# Patient Record
Sex: FEMALE | Race: OTHER | URBAN - METROPOLITAN AREA
[De-identification: names, ages, dates, MRNs, and addresses within clinical notes are randomized per-mention and may not be internally consistent; named-entity substitution may affect disease eponyms.]

---

## 2023-01-26 ENCOUNTER — HOSPITAL ENCOUNTER (OUTPATIENT)
Dept: PHYSICAL THERAPY | Age: 53
Discharge: HOME | End: 2023-01-26
Payer: OTHER GOVERNMENT

## 2023-01-26 PROCEDURE — 95992 CANALITH REPOSITIONING PROC: CPT

## 2023-01-26 PROCEDURE — 97161 PT EVAL LOW COMPLEX 20 MIN: CPT

## 2023-01-26 NOTE — PROGRESS NOTES
PHYSICAL THERAPY - DAILY TREATMENT NOTE     Patient Name: Hortensia Phillips        Date: 2023  : 1970   YES Patient  Verified  Visit #:   1     Insurance: Payor:  / Plan: Min Poe [id number] / Product Type:  /      In time: 335 Out time: 425   Total Treatment Time: 50     Medicare/[employer] Time Tracking (below)   Total Timed Codes (min):   1:1 Treatment Time:       TREATMENT AREA =  Dizziness and giddiness [R42]    SUBJECTIVE    Pain Level (on 0 to 10 scale):  0  / 10   Medication Changes/New allergies or changes in medical history, any new surgeries or procedures?     NO    If yes, update Summary List   Subjective Functional Status/Changes:  []  No changes reported     See eval /POC         OBJECTIVE  Modalities Rationale:     decrease pain to improve patient's ability to return to PLOF      min [] Estim, type/location:                                      []  att     []  unatt     []  w/US     []  w/ice    []  w/heat    min []  Mechanical Traction: type/lbs                   []  pro   []  sup   []  int   []  cont    []  before manual    []  after manual    min []  Ultrasound, settings/location:      min []  Iontophoresis w/ dexamethasone, location:                                               []  take home patch       []  in clinic    min []  Ice     []  Heat    location/position:     min []  Vasopneumatic Device, press/temp:     min []  Other:    [] Skin assessment post-treatment (if applicable):    []  intact    []  redness- no adverse reaction     []redness - adverse reaction:       min Therapeutic Exercise:  [x]  See flow sheet   Rationale:      increase ROM and increase strength to improve the patients ability to return to PLOF     10 min Canalith Repositioning Manuever: Technique:      [] S/DTM []IASTM []PROM [] Passive Stretching   []manual TPR    []Jt manipulation:Gr I [] II []  III [] IV[]  [x]L Roll  Treatment Area:  vestibular   Rationale:      decrease pain, increase ROM, increase tissue extensibility and decrease trigger points to improve patient's ability to return to PLOF     min Neuromuscular Re-ed: [x]  See flow sheet   Rationale:      improve coordination, improve balance, increase proprioception and dec dizziness to improve the patients ability to return to PLOF        min Self Care:    Rationale:    increase ROM, increase strength and improve coordination to improve the patients ability to return to PLOF    Billed With/As:   [] TE   [] TA   [] Neuro   [] Self Care Patient Education: [x] Review HEP    [] Progressed/Changed HEP based on:   [] positioning   [] body mechanics   [] transfers   [] heat/ice application    [] other:        Other Objective/Functional Measures:    See eval/ POC     Post Treatment Pain Level (on 0 to 10) scale:   0  / 10     ASSESSMENT    X  See POC     PLAN    [x]  Upgrade activities as tolerated {YES) Continue plan of care   []  Discharge due to :    []  Other:      Therapist: Favio Rhoades PT    Date: 1/26/2023 Time: 8:50 AM     Future Appointments   Date Time Provider Jacobo Beck   1/26/2023  3:30 PM Ronny Sky, PT [facility]

## 2023-01-26 NOTE — PROGRESS NOTES
[address] [place] [address] PHYSICAL THERAPY AT [facility] [address], [address] - Phone: [phone]  Fax: [phone] / [address] [facility]  Patient Name: Bharathi Modi : 1970   Medical   Diagnosis: Dizziness and giddiness [R42] Treatment Diagnosis: Dizziness;  imbalance   Onset Date: ; Worse since 2022     Referral Source: Gerhardt Doyne, MD [facility]): 2023   Prior Hospitalization: See medical history Provider #: [id number]   Prior Level of Function: Intermittent dizziness and imbalance with ADLs   Comorbidities: GI disease   Medications: Verified on Patient Summary List     The Plan of Care and following information is based on the information from the initial evaluation.     ========================================================================  Assessment / key information:   Bharathi Modi is a 50 y.o.  yo female with Dx of Dizziness and giddiness [R42]. She reports initial symptoms of dizziness and imbalance began in . She has had prior courses of treatment to include vestibular therapy. Symptoms have been manageable over the years. In 2022, she had an exacerbation of symptoms without known cause. 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 Current symptoms include: unsteadiness, especially on uneven surfaces or with head movements. She also has moments where she feels \"off. \". Objectively, the patient demonstrates  the following:  Balance: Decreased static balance: SLS: R= 15/15 sec, L= 15/15 sec, diminished ambulatory balance assessed via Functional Gait Assessment (FGA = 28/30 points)   Dizziness with ADLs: Dizziness Handicap Inventory (DHI = 16 points). Oculo-motor tests are WNLs except for reported increase in symptoms with Smooth Pursuit.     BPPV Assessment: Hallpike-[place] (right):neg, Hallpike-Veronica (left)neg, Roll Test (right):neg, Roll Test (left): neg for nystagmus but reported dizziness  Pt will benefit from PT/Vestibular rehab to address these deficits, improve functional independence and reduce dizziness and imbalance with normal daily activities. Thank you for this referral.   =======================================================================  Eval Complexity: History MEDIUM  Complexity : 1-2 comorbidities / personal factors will impact the outcome/ POC ;  Examination  HIGH Complexity : 4+ Standardized tests and measures addressing body structure, function, activity limitation and / or participation in recreation ; Presentation LOW Complexity : Stable, uncomplicated ;  Decision Making Other outcome measures DHI  LOW ; Overall Complexity LOW   Problem List: impaired gait/ balance, decrease ADL/ functional abilitiies, decrease activity tolerance and decrease transfer abilities   Treatment Plan may include any combination of the following: Therapeutic exercise, Therapeutic activities, Neuromuscular re-education, Gait/balance training and Patient education  Patient / Family readiness to learn indicated by: asking questions, trying to perform skills and interest  Persons(s) to be included in education: patient (P)  Barriers to Learning/Limitations: None  Measures taken, if barriers to learning:    Patient Goal (s): Better quality of life   Self reported health status: good  Rehabilitation Potential: excellent  Short Term Goals: To be accomplished in 4-6  treatments:  1. Patient will report at least 25% reduction of symptoms with ADLs. 2. Patient will be independent and complaint with HEP TID to reduce imbalance and dizziness with ADLs. 3. DHI will improve to less than or equal to 10 points to demonstrate reduction of dizziness and imbalance with ADLs. Long Term Goals: To be accomplished in 10-12 treatments:  1. Patient will report at least 50% reduction of symptoms with ADLs.   2. Patient will be independent with self progression of HEP and demonstrate willingness to continue HEP after D/C to maximize/maintain gains in functional mobility. 3. DHI will improve to less than or equal to 6 points to demonstrate significant reduction of dizziness and imbalance with ADLs. 4. FGA will improve to greater then or equal to 30/30 points to demonstrate a significant improvement in ambulatory balance. Frequency / Duration:   Patient to be seen  2  times per week for 4-6  weeks:  Patient / Caregiver education and instruction: self care, activity modification, and exercises    Therapist Signature: Thea Mcclellan PT Date: 3/61/2386   Certification Period:  Time: 4:28 PM   =====================================================================  I certify that the above Physical Therapy Services are being furnished while the patient is under my care. I agree with the treatment plan and certify that this therapy is necessary. Physician Signature:        Date:       Time:       Erin Petit MD  Please sign and return to [facility] or you may fax the signed copy to [phone]. Thank you.

## 2023-01-30 ENCOUNTER — HOSPITAL ENCOUNTER (OUTPATIENT)
Dept: PHYSICAL THERAPY | Age: 53
Discharge: HOME | End: 2023-01-30
Payer: OTHER GOVERNMENT

## 2023-01-30 PROCEDURE — 97530 THERAPEUTIC ACTIVITIES: CPT

## 2023-01-30 PROCEDURE — 97112 NEUROMUSCULAR REEDUCATION: CPT

## 2023-01-30 NOTE — PROGRESS NOTES
PHYSICAL THERAPY - DAILY TREATMENT NOTE     Patient Name: Lin Saeed        Date: 2023  : 1970   YES Patient  Verified  Visit #:   2     Insurance: Payor: BALJIT / Plan: Min Poe 74 / Product Type:  /      In time: 330 Out time: 405   Total Treatment Time: 35     Medicare/[facility] Time Tracking (below)   Total Timed Codes (min):   1:1 Treatment Time:       TREATMENT AREA =  Dizziness and giddiness [R42]    SUBJECTIVE    Pain Level (on 0 to 10 scale):  0  / 10   Medication Changes/New allergies or changes in medical history, any new surgeries or procedures? NO    If yes, update Summary List   Subjective Functional Status/Changes:  []  No changes reported     No dizziness today, slight dizziness day after first visit         OBJECTIVE      25 min Neuromuscular Re-ed: [x]  See flow sheet/ see below testing   Rationale:      improve coordination and improve balance to improve the patients ability to perform ADLs with dec Sx     10 min Therapeutic Activity: [x]  See flow sheet   Rationale:      increase strength, improve coordination, and improve balance to improve the patients ability to perform ADLs safely         Billed With/As:   [] TE   [] TA   [x] Neuro   [] Self Care Patient Education: [x] Review HEP    [] Progressed/Changed HEP based on:   [] positioning   [] body mechanics   [] transfers   [] heat/ice application    [] other:        Other Objective/Functional Measures:    B Roll: neg  B DH: neg   Post Treatment Pain Level (on 0 to 10) scale:   0  / 10     ASSESSMENT    Assessment/Changes in Function:     Initiated HEP--see flow sheet     []  See Progress Note/Recertification   Patient will continue to benefit from skilled PT services to modify and progress therapeutic interventions, address functional mobility deficits, analyze and cue movement patterns, and address imbalance/dizziness to attain remaining goals.       Progress toward goals / Updated goals:    Good Progress to    [] STG    [] LTG  2 as shown by initiation of HEP       []  See Progress Note/Recertification    PLAN    [x]  Upgrade activities as tolerated {YES) Continue plan of care   []  Discharge due to :    []  Other:      Therapist: Licha Cowan PT    Date: 1/30/2023 Time: 7:53 AM     Future Appointments   Date Time Provider Jacobo Beck   1/30/2023  3:30 PM Waterbury Hammock, PT [facility]   2/2/2023  5:00 PM Sheyla Moat, PTA [facility]   2/6/2023  4:30 PM Waterbury Hammock, PT [facility]   2/9/2023  5:00 PM Brian Hammock, PT [facility]   2/13/2023  3:00 PM Sheyla Moat, PTA [facility]   2/16/2023  4:30 PM Sheyla Moat, PTA [facility]   2/20/2023  4:30 PM Brian Hammock, PT [facility]   2/23/2023  4:30 PM Waterbury Hammock, PT [facility]   2/27/2023  4:30 PM Brian Hammock, PT [facility]   3/2/2023  4:30 PM Waterbury Hammock, PT [facility]

## 2023-02-02 ENCOUNTER — HOSPITAL ENCOUNTER (OUTPATIENT)
Dept: PHYSICAL THERAPY | Age: 53
Discharge: HOME | End: 2023-02-02
Payer: OTHER GOVERNMENT

## 2023-02-02 PROCEDURE — 97112 NEUROMUSCULAR REEDUCATION: CPT

## 2023-02-02 NOTE — PROGRESS NOTES
PHYSICAL THERAPY - DAILY TREATMENT NOTE     Patient Name: Mingo Pace        Date: 2023  : 1970   YES Patient  Verified  Visit #:   3   of     Insurance: Payor: BALJIT / Plan: Min Poe 74 / Product Type:  /      In time: 5:07 pm Out time: 5:22    Total Treatment Time: 15     Medicare/[facility] Time Tracking (below)   Total Timed Codes (min):  - 1:1 Treatment Time:  -     TREATMENT AREA =  Dizziness and giddiness [R42]    SUBJECTIVE    Pain Level (on 0 to 10 scale):  0  / 10   Medication Changes/New allergies or changes in medical history, any new surgeries or procedures? NO    If yes, update Summary List   Subjective Functional Status/Changes:  []  No changes reported     Pt reports mild sx today. Performing HEP 3x per day as instructed         OBJECTIVE  Modalities Rationale:    n/a       15 min Neuromuscular Re-ed: [x]  See flow sheet   Rationale:      increase ROM, improve coordination, improve balance, and increase proprioception to improve the patients ability to decrease dizziness sx in ADL's         Billed With/As:   [] TE   [] TA   [] Neuro   [] Self Care Patient Education: [x] Review HEP    [] Progressed/Changed HEP based on:   [] positioning   [] body mechanics   [] transfers   [] heat/ice application    [] other:        Other Objective/Functional Measures:    Review initial HEP     Post Treatment Pain Level (on 0 to 10) scale:   0  / 10     ASSESSMENT    Assessment/Changes in Function:     Advanced static and dynamic balance per flow sheet  Mild sx during Gt with HT, Smooth pursuit/Saccades for bilateral digaonal patterns. []  See Progress Note/Recertification   Patient will continue to benefit from skilled PT services to modify and progress therapeutic interventions, address functional mobility deficits, address ROM deficits, address imbalance/dizziness, and instruct in home and community integration to attain remaining goals.       Progress toward goals / Updated goals:    First visit from initial evaluation.  Progressed treatment per plan of care       PLAN    [x]  Upgrade activities as tolerated YES Continue plan of care   []  Discharge due to :    []  Other:      Therapist: Kj Whalen PTA    Date: 2/2/2023 Time: 5:22 PM     Future Appointments   Date Time Provider Jacobo Beck   2/6/2023  4:30 PM Mariana Cargo, PT [facility]   2/9/2023  5:00 PM Mariana Cargo, PT [facility]   2/13/2023  3:00 PM Ramonia Human, PTA [facility]   2/16/2023  4:30 PM Ramonia Human, PTA [facility]   2/20/2023  4:30 PM Mariana Cargo, PT [facility]   2/23/2023  4:30 PM Mariana Cargo, PT [facility]   2/27/2023  4:30 PM Mariana Cargo, PT [facility]   3/2/2023  4:30 PM Mariana Cargo, PT [facility]

## 2023-02-06 ENCOUNTER — HOSPITAL ENCOUNTER (OUTPATIENT)
Dept: PHYSICAL THERAPY | Age: 53
Discharge: HOME | End: 2023-02-06
Payer: OTHER GOVERNMENT

## 2023-02-06 PROCEDURE — 97530 THERAPEUTIC ACTIVITIES: CPT

## 2023-02-06 PROCEDURE — 97112 NEUROMUSCULAR REEDUCATION: CPT

## 2023-02-06 NOTE — PROGRESS NOTES
PHYSICAL THERAPY - DAILY TREATMENT NOTE     Patient Name: Sean Mcfarland        Date: 2023  : 1970   YES Patient  Verified  Visit #:   4     Insurance: Payor: BALJIT / Plan: Min Poe 74 / Product Type:  /      In time: 425 Out time: 505   Total Treatment Time: 40     Medicare/[facility] Time Tracking (below)   Total Timed Codes (min):   1:1 Treatment Time:       TREATMENT AREA =  Dizziness and giddiness [R42]    SUBJECTIVE    Pain Level (on 0 to 10 scale):  0  / 10   Medication Changes/New allergies or changes in medical history, any new surgeries or procedures? NO    If yes, update Summary List   Subjective Functional Status/Changes:  []  No changes reported     No dizziness currently but had momentary unsteadiness in the shower.     Doing ex's at home         OBJECTIVE    25 min Neuromuscular Re-ed: [x]  See flow sheet   Rationale:      increase ROM, improve coordination, improve balance, and increase proprioception to improve the patients ability to decrease dizziness sx in ADL's      15 min Therapeutic Activity: [x]  See flow sheet   Rationale:      increase strength, improve coordination, and improve balance to improve the patients ability to perform ADLs safely       Billed With/As:   [] TE   [] TA   [] Neuro   [] Self Care Patient Education: [x] Review HEP    [] Progressed/Changed HEP based on:   [] positioning   [] body mechanics   [] transfers   [] heat/ice application    [] other:            Other Objective/Functional Measures:    Progressed VSE to standing with metronome     Added BOSU stance and LOS   Post Treatment Pain Level (on 0 to 10) scale:   0  / 10     ASSESSMENT    Assessment/Changes in Function:     Progressing balance and vestibular ex's     []  See Progress Note/Recertification    Patient will continue to benefit from skilled PT services to modify and progress therapeutic interventions, address functional mobility deficits, analyze and cue movement patterns, and address imbalance/dizziness to attain remaining goals. Progress toward goals / Updated goals:     Short Term Goals:   1. Patient will report at least 25% reduction of symptoms with ADLs. 2. Patient will be independent and complaint with HEP TID to reduce imbalance and dizziness with ADLs. --progressing  3. DHI will improve to less than or equal to 10 points to demonstrate reduction of dizziness and imbalance with ADLs. Long Term Goals:   1. Patient will report at least 50% reduction of symptoms with ADLs. 2. Patient will be independent with self progression of HEP and demonstrate willingness to continue HEP after D/C to maximize/maintain gains in functional mobility. 3. DHI will improve to less than or equal to 6 points to demonstrate significant reduction of dizziness and imbalance with ADLs. 4. FGA will improve to greater then or equal to 30/30 points to demonstrate a significant improvement in ambulatory balance.              PLAN    [x]  Upgrade activities as tolerated {YES) Continue plan of care   []  Discharge due to :    []  Other:      Therapist: Мария Caraballo PT    Date: 2/6/2023 Time: 7:52 AM     Future Appointments   Date Time Provider Jacobo Beck   2/6/2023  4:30 PM Wang Ortiz PT [facility]   2/9/2023  5:00 PM Wang Ortiz PT [facility]   2/13/2023  3:00 PM Madhavi [facility], PTA [facility]   2/16/2023  4:30 PM Madhavi Greenberg PTA [facility]   2/20/2023  4:30 PM Wang Ortiz, PT [facility]   2/23/2023  4:30 PM Wang Ortiz PT [facility]   2/27/2023  4:30 PM Wang Ortiz PT [facility]   3/2/2023  4:30 PM Wang Ortiz PT [facility]

## 2023-02-09 ENCOUNTER — HOSPITAL ENCOUNTER (OUTPATIENT)
Dept: PHYSICAL THERAPY | Age: 53
Discharge: HOME | End: 2023-02-09
Payer: OTHER GOVERNMENT

## 2023-02-09 PROCEDURE — 97112 NEUROMUSCULAR REEDUCATION: CPT

## 2023-02-09 PROCEDURE — 97530 THERAPEUTIC ACTIVITIES: CPT

## 2023-02-09 NOTE — PROGRESS NOTES
PHYSICAL THERAPY - DAILY TREATMENT NOTE     Patient Name: Tayo Dsouza        Date: 2023  : 1970   YES Patient  Verified  Visit #:   5     Insurance: Payor: BALJIT / Plan: Min Poe 74 / Product Type:  /      In time: 500 Out time: 540   Total Treatment Time: 40     Medicare/[employer] Time Tracking (below)   Total Timed Codes (min):   1:1 Treatment Time:       TREATMENT AREA =  Dizziness and giddiness [R42]    SUBJECTIVE    Pain Level (on 0 to 10 scale):  0  / 10   Medication Changes/New allergies or changes in medical history, any new surgeries or procedures? NO    If yes, update Summary List   Subjective Functional Status/Changes:  []  No changes reported     VSE makes me tired    Overall improvement noted         OBJECTIVE    25 min Neuromuscular Re-ed: [x]  See flow sheet   Rationale:      increase ROM, improve coordination, improve balance, and increase proprioception to improve the patients ability to decrease dizziness sx in ADL's      15 min Therapeutic Activity: [x]  See flow sheet   Rationale:      increase strength, improve coordination, and improve balance to improve the patients ability to perform ADLs safely       Billed With/As:   [] TE   [x] TA   [x] Neuro   [] Self Care Patient Education: [x] Review HEP    [x] Progressed/Changed HEP based on: tolerance  [] positioning   [] body mechanics   [] transfers   [] heat/ice application    [] other:            Other Objective/Functional Measures: Added quick turns--no inc in Sx reported.     Incr speed with VSE to 95bpm.  Mild inc in Sx reported after 1 min    Added VVI   Post Treatment Pain Level (on 0 to 10) scale:   0  / 10     ASSESSMENT    Assessment/Changes in Function:     Progressing static and dynamic balance  Improving vestibular adaptation     []  See Progress Note/Recertification    Patient will continue to benefit from skilled PT services to modify and progress therapeutic interventions, address functional mobility deficits, analyze and cue movement patterns, and address imbalance/dizziness to attain remaining goals. Progress toward goals / Updated goals:     Short Term Goals:   1. Patient will report at least 25% reduction of symptoms with ADLs. - progressing  2. Patient will be independent and complaint with HEP TID to reduce imbalance and dizziness with ADLs. --progressing  3. DHI will improve to less than or equal to 10 points to demonstrate reduction of dizziness and imbalance with ADLs. Long Term Goals:   1. Patient will report at least 50% reduction of symptoms with ADLs. 2. Patient will be independent with self progression of HEP and demonstrate willingness to continue HEP after D/C to maximize/maintain gains in functional mobility. 3. DHI will improve to less than or equal to 6 points to demonstrate significant reduction of dizziness and imbalance with ADLs. 4. FGA will improve to greater then or equal to 30/30 points to demonstrate a significant improvement in ambulatory balance.              PLAN    [x]  Upgrade activities as tolerated {YES) Continue plan of care   []  Discharge due to :    []  Other:      Therapist: Florecita Grove PT    Date: 2/9/2023 Time: 7:54 AM     Future Appointments   Date Time Provider Jacobo Beck   2/9/2023  5:00 PM Damari Multani PT [facility]   2/13/2023  3:00 PM Jon Woo PTA [facility]   2/16/2023  4:30 PM Jon Woo PTA [facility]   2/20/2023  4:30 PM Damari Multani PT [facility]   2/23/2023  4:30 PM Damari Multani PT [facility]   2/27/2023  4:30 PM Damari Multani PT [facility]   3/2/2023  4:30 PM Damari Multani PT [facility]

## 2023-02-13 ENCOUNTER — HOSPITAL ENCOUNTER (OUTPATIENT)
Facility: HOSPITAL | Age: 53
Setting detail: RECURRING SERIES
Discharge: HOME | End: 2023-02-16
Payer: OTHER GOVERNMENT

## 2023-02-13 ENCOUNTER — APPOINTMENT (OUTPATIENT)
Dept: PHYSICAL THERAPY | Age: 53
End: 2023-02-13
Payer: OTHER GOVERNMENT

## 2023-02-13 PROCEDURE — 97112 NEUROMUSCULAR REEDUCATION: CPT

## 2023-02-13 NOTE — PROGRESS NOTES
PHYSICAL / OCCUPATIONAL THERAPY - DAILY TREATMENT NOTE (updated )    Patient Name: Terrell Vargas    Date: 2023    : 1970  Insurance: Payor:  EAST / Plan: [employer] EAST  / Product Type: *No Product type* /      Patient  verified Yes     Visit #   Current / Total 6 8   Time   In / Out 3:10 pm 3:39    Pain   In / Out 0 0   Subjective Functional Status/Changes: Pt reports walked more on Saturday. This morning doing HEP went into the garage and turned and felt unsteadiness. Layed down and she felt. My whole body felt tired. LE feel tired . Moderate sx presentily   Changes to:  Meds, Allergies, Med Hx, Sx Hx? If yes, update Summary List no       TREATMENT AREA =  No admission diagnoses are documented for this encounter. OBJECTIVE         Therapeutic Procedures: Tx Min Billable or 1:1 Min (if diff from Tx Min) Procedure, Rationale, Specifics   29  [id number] Neuromuscular Re-Education (timed):  improve balance, coordination, kinesthetic sense, posture, core stability and proprioception to improve patient's ability to develop conscious control of individual muscles and awareness of position of extremities in order to progress to PLOF and address remaining functional goals.  (see flow sheet as applicable)     Details if applicable:              Details if applicable:            Details if applicable:            Details if applicable:            Details if applicable:       [facility] Totals Reminder: bill using total billable min of TIMED therapeutic procedures (example: do not include dry needle or estim unattended, both untimed codes, in totals to left)  8-22 min = 1 unit; 23-37 min = 2 units; 38-52 min = 3 units; 53-67 min = 4 units; 68-82 min = 5 units   Total Total     [x]  Patient Education billed concurrently with other procedures   [x] Review HEP    [] Progressed/Changed HEP, detail:    [] Other detail:       Objective Information/Functional Measures/Assessment    Decreased ex per flow sheet  Hold VVI resume as tolerated    Patient will continue to benefit from skilled PT / OT services to modify and progress therapeutic interventions, analyze and cue for proper movement patterns, analyze and modify for postural abnormalities, analyze and address imbalance/dizziness, and instruct in home and community integration to address functional deficits and attain remaining goals. Progress toward goals / Updated goals:  []  See Progress Note/Recertification    Hold ex per flow sheet secondary to increased sx over the weekend. Moderate sx noted throughout exercises with increased sx with VSE ROM & fatigue . Review decrease in HEP for sx management.      PLAN  Yes  Continue plan of care  [x]  Upgrade activities as tolerated  []  Discharge due to :  []  Other:    Lulu Grayson, WILIAN    2/13/2023    3:16 PM    Future Appointments   Date Time Provider Ken Flores   2/16/2023  4:30 PM Athena Lund [facility]   2/20/2023  4:30 PM Gaby Saliva, PT [facility]   2/23/2023  4:30 PM Gaby Saliva, PT [facility]   2/27/2023  4:30 PM Gaby Saliva, PT [facility]   3/2/2023  4:30 PM Gaby Saliva, PT [facility]

## 2023-02-16 ENCOUNTER — HOSPITAL ENCOUNTER (OUTPATIENT)
Facility: HOSPITAL | Age: 53
Setting detail: RECURRING SERIES
Discharge: HOME | End: 2023-02-19
Payer: OTHER GOVERNMENT

## 2023-02-16 ENCOUNTER — APPOINTMENT (OUTPATIENT)
Dept: PHYSICAL THERAPY | Age: 53
End: 2023-02-16
Payer: OTHER GOVERNMENT

## 2023-02-16 PROCEDURE — 97530 THERAPEUTIC ACTIVITIES: CPT

## 2023-02-16 PROCEDURE — 97112 NEUROMUSCULAR REEDUCATION: CPT

## 2023-02-16 NOTE — PROGRESS NOTES
PHYSICAL / OCCUPATIONAL THERAPY - DAILY TREATMENT NOTE (updated )    Patient Name: Rigoberto Argueta    Date: 2023    : 1970  Insurance: Payor: [employer] EAST / Plan: [employer] EAST  / Product Type: *No Product type* /      Patient  verified Yes     Visit #   Current / Total 7 8   Time   In / Out 4:28 pm 4:55 pm   Pain   In / Out 0 0   Subjective Functional Status/Changes: Pt reports she is feeling better. She has been doing some of the postioning ; mild sx today   Changes to:  Meds, Allergies, Med Hx, Sx Hx? If yes, update Summary List no       TREATMENT AREA =  No admission diagnoses are documented for this encounter. OBJECTIVE         Therapeutic Procedures: Tx Min Billable or 1:1 Min (if diff from Tx Min) Procedure, Rationale, Specifics   17 17 [id number] Neuromuscular Re-Education (timed):  improve balance, coordination, kinesthetic sense, posture, core stability and proprioception to improve patient's ability to develop conscious control of individual muscles and awareness of position of extremities in order to progress to PLOF and address remaining functional goals. (see flow sheet as applicable)     Details if applicable:       10 10 [id number] Self Care/Home Management (timed):  improve patient knowledge and understanding of pain reducing techniques, positioning, posture/ergonomics, home safety, activity modification, and physical therapy expectations, procedures and progression  to improve patient's ability to progress to PLOF and address remaining functional goals.   (see flow sheet as applicable)     Details if applicable:            Details if applicable:            Details if applicable:            Details if applicable:     27 24 [facility] Totals Reminder: bill using total billable min of TIMED therapeutic procedures (example: do not include dry needle or estim unattended, both untimed codes, in totals to left)  8-22 min = 1 unit; 23-37 min = 2 units; 38-52 min = 3 units; 53-67 min = 4 units; 68-82 min = 5 units   Total Total     [x]  Patient Education billed concurrently with other procedures   [x] Review HEP    [] Progressed/Changed HEP, detail:    [] Other detail:       Objective Information/Functional Measures/Assessment    Neg Roll and Hallpike TUCKER    Patient will continue to benefit from skilled PT / OT services to modify and progress therapeutic interventions, analyze and address functional mobility deficits, analyze and address imbalance/dizziness, and instruct in home and community integration to address functional deficits and attain remaining goals. Progress toward goals / Updated goals:  []  See Progress Note/Recertification    Pt demonstrating good improvement in static balance from prior session.  Review current HEP, self monitoring of sx with ex, slow progression of home walking program.   Mild sx with R Roll test   Pt reporting LE fatigue with VSE ROM     PLAN  Yes  Continue plan of care  []  Upgrade activities as tolerated  []  Discharge due to :  []  Other:    Kimberly Centeno, WILIAN    2/16/2023    4:28 PM    Future Appointments   Date Time Provider Ken Flores   2/16/2023  4:30 PM Jayashree Plaza [facility]   2/20/2023  4:30 PM Víctor Ashford PT [facility]   2/23/2023  4:30 PM Víctor Ashford, PT [facility]   2/27/2023  4:30 PM Víctor Ashford, PT [facility]   3/2/2023  4:30 PM Víctor Ashford, PT [facility]

## 2023-02-20 ENCOUNTER — HOSPITAL ENCOUNTER (OUTPATIENT)
Facility: HOSPITAL | Age: 53
Setting detail: RECURRING SERIES
End: 2023-02-20
Payer: OTHER GOVERNMENT

## 2023-02-20 ENCOUNTER — APPOINTMENT (OUTPATIENT)
Dept: PHYSICAL THERAPY | Age: 53
End: 2023-02-20
Payer: OTHER GOVERNMENT

## 2023-02-20 NOTE — PROGRESS NOTES
PHYSICAL / OCCUPATIONAL THERAPY - DAILY TREATMENT NOTE (updated )    Patient Name: Danielle Gomez    Date: 2023    : 1970  Insurance: Payor:  EAST / Plan: [employer] EAST  / Product Type: *No Product type* /      Patient  verified yes     Visit #   Current / Total 8 8-12   Time   In / Out *** ***   Pain   In / Out *** ***   Subjective Functional Status/Changes: ***   Changes to:  Meds, Allergies, Med Hx, Sx Hx?  If yes, update Summary List no     TREATMENT AREA =  No admission diagnoses are documented for this encounter.    OBJECTIVE    Modalities Rationale:     {In[employer] Modality Rationale:34473} to improve patient's ability to progress to PLOF and address remaining functional goals.     min [] Estim Unattended, type/location:                                      []  w/ice    []  w/heat    min [] Estim Attended, type/location:                                     []  w/US     []  w/ice    []  w/heat    []  TENS insruct      min []  Mechanical Traction: type/lbs                   []  pro   []  sup   []  int   []  cont    []  before manual    []  after manual    min []  Ultrasound, settings/location:      min []  Iontophoresis w/ dexamethasone, location:                                               []  take home patch       []  in clinic    min  unbilled []  Ice     []  Heat    location/position:     min []  Paraffin,  details:     min []  Vasopneumatic Device, press/temp:     min []  Whirlpool / Fluido:    If using vaso (only need to measure limb vaso being performed on)      pre-treatment girth :       post-treatment girth :       measured at (landmark location) :      min []  Other:    Skin assessment post-treatment (if applicable):    []  intact    []  redness- no adverse reaction                 []redness - adverse reaction:        Therapeutic Procedures:  Tx Min Billable or 1:1 Min (if diff from Tx Min) Procedure, Rationale, Specifics     {In[employer] Ther Procedures:12613}     Details if  applicable:         {In[employer] Ther Procedures:36143}     Details if applicable:       {In[employer] Ther Procedures:51099}     Details if applicable:       {InMotion Ther Procedures:28268}     Details if applicable:       {In[employer] Ther Procedures:70148}     Details if applicable:       [facility] Totals Reminder: bill using total billable min of TIMED therapeutic procedures (example: do not include dry needle or estim unattended, both untimed codes, in totals to left)  8-22 min = 1 unit; 23-37 min = 2 units; 38-52 min = 3 units; 53-67 min = 4 units; 68-82 min = 5 units   Total Total     [x]  Patient Education billed concurrently with other procedures   [x] Review HEP    [] Progressed/Changed HEP, detail:    [] Other detail:       Objective Information/Functional Measures/Assessment    ***     Patient will continue to benefit from skilled PT / OT services to modify and progress therapeutic interventions, analyze and address functional mobility deficits, analyze and address imbalance/dizziness, and instruct in home and community integration to address functional deficits and attain remaining goals. Progress towards LTGs:  Short Term Goals:   1. Patient will report at least 25% reduction of symptoms with ADLs. - progressing  2. Patient will be independent and complaint with HEP TID to reduce imbalance and dizziness with ADLs. --progressing  3. DHI will improve to less than or equal to 10 points to demonstrate reduction of dizziness and imbalance with ADLs. Long Term Goals:   1. Patient will report at least 50% reduction of symptoms with ADLs. 2. Patient will be independent with self progression of HEP and demonstrate willingness to continue HEP after D/C to maximize/maintain gains in functional mobility. 3. DHI will improve to less than or equal to 6 points to demonstrate significant reduction of dizziness and imbalance with ADLs.   4. FGA will improve to greater then or equal to 30/30 points to demonstrate a significant improvement in ambulatory balance.    PLAN  yes Continue plan of care  [x]  Upgrade activities as tolerated  []  Discharge due to :  []  Other:    Ellen Jones, PT    2/20/2023    7:55 AM    Future Appointments   Date Time Provider Department Center   2/20/2023  4:30 PM Ellen Jones, PT [facility]   2/23/2023  4:30 PM Ellen Jones, PT [facility]   2/27/2023  4:30 PM Ellen Jones PT [facility]   3/2/2023  4:30 PM Ellen Jones, PT [facility]

## 2023-02-23 ENCOUNTER — HOSPITAL ENCOUNTER (OUTPATIENT)
Facility: HOSPITAL | Age: 53
Setting detail: RECURRING SERIES
Discharge: HOME | End: 2023-02-26
Payer: OTHER GOVERNMENT

## 2023-02-23 ENCOUNTER — APPOINTMENT (OUTPATIENT)
Dept: PHYSICAL THERAPY | Age: 53
End: 2023-02-23
Payer: OTHER GOVERNMENT

## 2023-02-23 PROCEDURE — 97112 NEUROMUSCULAR REEDUCATION: CPT

## 2023-02-27 ENCOUNTER — HOSPITAL ENCOUNTER (OUTPATIENT)
Facility: HOSPITAL | Age: 53
Setting detail: RECURRING SERIES
Discharge: HOME | End: 2023-03-02
Payer: OTHER GOVERNMENT

## 2023-02-27 ENCOUNTER — APPOINTMENT (OUTPATIENT)
Dept: PHYSICAL THERAPY | Age: 53
End: 2023-02-27
Payer: OTHER GOVERNMENT

## 2023-02-27 PROCEDURE — 97112 NEUROMUSCULAR REEDUCATION: CPT

## 2023-02-27 NOTE — PROGRESS NOTES
PHYSICAL / OCCUPATIONAL THERAPY - DAILY TREATMENT NOTE (updated )    Patient Name: Akash Duenas    Date: 2023    : 1970  Insurance: Payor: [employer] EAST / Plan: [employer] EAST / Product Type: *No Product type* /      Patient  verified yes     Visit #   Current / Total 9 8-12   Time   In / Out 430 5   Pain   In / Out 0 0   Subjective Functional Status/Changes: Just feel slightly off. Working on VSE/ VVI 3x/day   Changes to:  Meds, Allergies, Med Hx, Sx Hx? If yes, update Summary List no     TREATMENT AREA =  No admission diagnoses are documented for this encounter. OBJECTIVE        Therapeutic Procedures: Tx Min Billable or 1:1 Min (if diff from Tx Min) Procedure, Rationale, Specifics   30  [id number] Neuromuscular Re-Education (timed):  improve balance, coordination, kinesthetic sense, posture, core stability and proprioception to improve patient's ability to develop conscious control of individual muscles and awareness of position of extremities in order to progress to PLOF and address remaining functional goals.  (see flow sheet as applicable)     Details if applicable:                             [facility] Totals Reminder: bill using total billable min of TIMED therapeutic procedures (example: do not include dry needle or estim unattended, both untimed codes, in totals to left)  8-22 min = 1 unit; 23-37 min = 2 units; 38-52 min = 3 units; 53-67 min = 4 units; 68-82 min = 5 units   Total Total     [x]  Patient Education billed concurrently with other procedures   [x] Review HEP    [] Progressed/Changed HEP, detail:    [] Other detail:       Objective Information/Functional Measures/Assessment    Added ball circles to HEP    VSE progressed to busy background and resumed metronome at 90 bpm    VVI progressed to 1 min       Patient will continue to benefit from skilled PT / OT services to modify and progress therapeutic interventions, analyze and address functional mobility deficits, analyze and address imbalance/dizziness, and instruct in home and community integration to address functional deficits and attain remaining goals. Progress towards LTGs:  Long Term Goals:   1. Patient will report at least 50% reduction of symptoms with ADLs.--MET  2. Patient will be independent with self progression of HEP and demonstrate willingness to continue HEP after D/C to maximize/maintain gains in functional mobility. -progressing  3. [address] will improve to less than or equal to 6 points to demonstrate significant reduction of dizziness and imbalance with ADLs. 4. FGA will improve to greater then or equal to 30/30 points to demonstrate a significant improvement in ambulatory balance. --progressing well    PLAN  yes Continue plan of care  [x]  Upgrade activities as tolerated  []  Discharge due to :  []  Other:    Gab Dickson PT    2/27/2023    8:17 AM    Future Appointments   Date Time Provider Ken Flores   2/27/2023  4:30 PM Gab Dickson, PT [facility]   3/2/2023  4:30 PM [facility]

## 2023-03-02 ENCOUNTER — HOSPITAL ENCOUNTER (OUTPATIENT)
Facility: HOSPITAL | Age: 53
Setting detail: RECURRING SERIES
Discharge: HOME | End: 2023-03-05
Payer: OTHER GOVERNMENT

## 2023-03-02 ENCOUNTER — APPOINTMENT (OUTPATIENT)
Dept: PHYSICAL THERAPY | Age: 53
End: 2023-03-02

## 2023-03-02 PROCEDURE — 97112 NEUROMUSCULAR REEDUCATION: CPT

## 2023-03-02 PROCEDURE — 97535 SELF CARE MNGMENT TRAINING: CPT

## 2023-03-02 NOTE — PROGRESS NOTES
PHYSICAL / OCCUPATIONAL THERAPY - DAILY TREATMENT NOTE (updated )    Patient Name: Sammi Moya    Date: 3/2/2023    : 1970  Insurance: Payor:  EAST / Plan:  EAST / Product Type: *No Product type* /      Patient  verified yes     Visit #   Current / Total 10 8-12   Time   In / Out *** ***   Pain   In / Out *** ***   Subjective Functional Status/Changes: ***   Changes to:  Meds, Allergies, Med Hx, Sx Hx? If yes, update Summary List no     TREATMENT AREA =  No admission diagnoses are documented for this encounter. OBJECTIVE      Therapeutic Procedures: Tx Min Billable or 1:1 Min (if diff from Lissa) Procedure, Rationale, Specifics     10140 Neuromuscular Re-Education (timed):  improve balance, coordination, kinesthetic sense, posture, core stability and proprioception to improve patient's ability to develop conscious control of individual muscles and awareness of position of extremities in order to progress to PLOF and address remaining functional goals.  (see flow sheet as applicable)     Details if applicable:         {In[employer] Ther Procedures:46376}     Details if applicable:       {In[employer] Ther Procedures:74530}     Details if applicable:       {In[employer] Ther Procedures:98641}     Details if applicable:       {In[employer] Ther Procedures:15725}     Details if applicable:       [facility] Totals Reminder: bill using total billable min of TIMED therapeutic procedures (example: do not include dry needle or estim unattended, both untimed codes, in totals to left)  8-22 min = 1 unit; 23-37 min = 2 units; 38-52 min = 3 units; 53-67 min = 4 units; 68-82 min = 5 units   Total Total     [x]  Patient Education billed concurrently with other procedures   [x] Review HEP    [] Progressed/Changed HEP, detail:    [] Other detail:       Objective Information/Functional Measures/Assessment    See note       Patient will continue to benefit from skilled PT / OT services to {[employer] Skilled Services:74297} to address functional deficits and attain remaining goals.         PLAN  yes Continue plan of care  []  Upgrade activities as tolerated  [x]  Discharge due to :  []  Other:    Mary Kemp PT    3/2/2023    8:47 AM    Future Appointments   Date Time Provider Ken Flores   3/2/2023  4:30 PM Mary Kemp PT [facility]

## 2023-03-02 NOTE — PROGRESS NOTES
[facility] PHYSICAL THERAPY  [address] [facility], [address], [place], [address] Ph: [phone] Fx: [phone]  PHYSICAL THERAPY PROGRESS NOTE  Patient Name: Delvis Soares : 1970   Treatment/Medical Diagnosis: No admission diagnoses are documented for this encounter. Referral Source: Alex Bennett MD     Date of Initial Visit: 23 Attended Visits: 10 Missed Visits: -     SUMMARY OF TREATMENT  PT has consisted of neuro re-education (using adaptation, substitution and habituation techniques) with emphasis on HEP    CURRENT STATUS  Patient has progressed well through this course of PT.   ***    PRIOR GOALS:  Long Term Goals:   1. Patient will report at least 50% reduction of symptoms with ADLs. 2. Patient will be independent with self progression of HEP and demonstrate willingness to continue HEP after D/C to maximize/maintain gains in functional mobility. 3. DHI will improve to less than or equal to 6 points to demonstrate significant reduction of dizziness and imbalance with ADLs. 4. FGA will improve to greater then or equal to 30/30 points to demonstrate a significant improvement in ambulatory balance. Status at last Eval: n/a  Current Status: ***  Goal Met? {Yes/No-Ex:219740}    2. Status at last Eval: n/a  Current Status: indep with finalized HEP and self progression  Goal Met?  yes    3. Status at last Eval:   Current Status: ***  Goal Met? {Yes/No-Ex:611890}    4. Status at last Eval:   Current Status: ***  Goal Met? {Yes/No-Ex:233679}      New Goals to be achieved in __***__ {[facility]04179}  1. ***  2. ***  3. ***  4. ***    RECOMMENDATIONS  ***  If you have any questions/comments please contact us directly at [phone]. Thank you for allowing us to assist in the care of your patient.     Flower Kothari, PT       3/2/2023       8:50 AM    ===================================================================  I certify that the above Therapy Services are being furnished while the patient is under my care. I agree with the treatment plan and certify that this therapy is necessary. [de-identified] Signature:_________________________   DATE:_________   TIME:________                           Adelia Cooper MD    ** Signature, Date and Time must be completed for valid certification **  [facility] [phone].   Thank you

## 2023-03-02 NOTE — PROGRESS NOTES
PHYSICAL / OCCUPATIONAL THERAPY - DAILY TREATMENT NOTE (updated )    Patient Name: Aleyda Rouse    Date: 3/2/2023    : 1970  Insurance: Payor: [employer] EAST / Plan: [employer] EAST / Product Type: *No Product type* /      Patient  verified Yes     Visit #   Current / Total 10 12   Time   In / Out 4:32 pm 5:17 pm   Pain   In / Out 0 0   Subjective Functional Status/Changes: Pt reports ~ 70-80% overall improvement  Balance is feeling better. fatigue/tiremess after eye exercises lasting 1-2 minutes   Changes to:  Meds, Allergies, Med Hx, Sx Hx? If yes, update Summary List no       TREATMENT AREA =  No admission diagnoses are documented for this encounter. OBJECTIVE         Therapeutic Procedures: Tx Min Billable or 1:1 Min (if diff from Tx Min) Procedure, Rationale, Specifics   30  [id number] Neuromuscular Re-Education (timed):  improve balance, coordination, kinesthetic sense, posture, core stability and proprioception to improve patient's ability to develop conscious control of individual muscles and awareness of position of extremities in order to progress to PLOF and address remaining functional goals. (see flow sheet as applicable)     Details if applicable:       15  99861 Self Care/Home Management (timed):  improve patient knowledge and understanding of positioning, home safety, activity modification, and physical therapy expectations, procedures and progression  to improve patient's ability to progress to PLOF and address remaining functional goals.   (see flow sheet as applicable)     Details if applicable:            Details if applicable:            Details if applicable:            Details if applicable:     39 39 [facility] Totals Reminder: bill using total billable min of TIMED therapeutic procedures (example: do not include dry needle or estim unattended, both untimed codes, in totals to left)  8-22 min = 1 unit; 23-37 min = 2 units; 38-52 min = 3 units; 53-67 min = 4 units; 68-82 min = 5 units Total Total     [x]  Patient Education billed concurrently with other procedures   [x] Review HEP    [] Progressed/Changed HEP, detail:    [] Other detail:       Objective Information/Functional Measures/Assessment    DHI: 16     Patient will continue to benefit from skilled PT / OT services to modify and progress therapeutic interventions, analyze and address imbalance/dizziness, and instruct in home and community integration to address functional deficits and attain remaining goals. Progress toward goals / Updated goals:  []  See Progress Note/Recertification    See progress note    PLAN  No  Continue plan of care  []  Upgrade activities as tolerated  []  Discharge due to :  []  Other:    Marcella Dias, PTA    3/2/2023    4:50 PM    No future appointments.

## 2023-03-02 NOTE — PROGRESS NOTES
[address] [facility] PHYSICAL THERAPY  [address] [facility], [address], [place], [address] Ph: [phone] Fx: [phone]  PHYSICAL THERAPY PROGRESS NOTE  Patient Name: Archana Snider : 1970   Treatment/Medical Diagnosis: Dizziness and giddiness [R42]   Referral Source: Khalida Marina MD     Date of Initial Visit: 2023 Attended Visits: 10 Missed Visits: 2     SUMMARY OF TREATMENT  Physical therapy has consisted of Neuromuscular re education for static and dynamic balance, VSE and VVI exercises, Smooth pursuit, Saccades, Convergence HEP, and pt education in activity modification, use of HEP for sx management. CURRENT STATUS  Ms Janet Brown has progressed well in physical therapy demonstrating and reporting improving static and dynamic balance and decreasing sx with exercises and ADLs. SLS: R: 30 seconds; L: 20 seconds  FGA:  improving from  @ initial evaluation. Patient will report at least 25% reduction in symptoms with ADLs. Status at last Eval: na  Current Status: Pt reports ~ 70-80% overall improvement  Balance is feeling better. fatigue/tiremess after eye exercises lasting 1-2 minutes  Goal Met?  yes    2. Patient will be independent and compliant with HEP TID to reduce imbalance and dizziness with ADLs. Status at last Eval: N/A  Current Status: pt independent and compliant with current HEP. Goal Met?  yes    3. DHI will improve to less than or equal to 10 points to demonstrate reduction of dizziness and imbalance with ADLs. Status at last Eval: 16  Current Status: 16  Goal Met?  no      New Goals to be achieved in __4__ weeks  1. Patient will report at < or = 80% reduction of symptoms with ADLs. 2. Patient will be independent with self progression of HEP and demonstrate significant reduction of dizziness and imbalance with ADLs.   3. DHI will improve to less than or equal to 6 points to demonstrate significant reduction of dizziness and imbalance with ADLs. 4. FGA will improve to greater than or equal to 6 points to demonstrate significant improvement in ambulatory balance. RECOMMENDATIONS  Continue 1x per week to every other week x 2- 4 weeks. If you have any questions/comments please contact us directly at [phone]. Thank you for allowing us to assist in the care of your patient.     Mariya Dunlap PTA       3/2/2023       5:11 PM

## 2023-03-16 ENCOUNTER — HOSPITAL ENCOUNTER (OUTPATIENT)
Facility: HOSPITAL | Age: 53
Setting detail: RECURRING SERIES
Discharge: HOME | End: 2023-03-19
Payer: OTHER GOVERNMENT

## 2023-03-16 PROCEDURE — 97112 NEUROMUSCULAR REEDUCATION: CPT

## 2023-03-16 NOTE — PROGRESS NOTES
Measures/Assessment    ***    Patient will continue to benefit from skilled PT / OT services to {[facility]:83110} to address functional deficits and attain remaining goals.     Progress toward goals / Updated goals:  []  See Progress Note/Recertification    ***    PLAN  Yes  Continue plan of care  []  Upgrade activities as tolerated  []  Discharge due to :  []  Other:    Debbie Farmer, WILIAN    3/16/2023    5:22 PM    Future Appointments   Date Time Provider Ken Flores   3/31/2023 12:30 PM Mariano Garcia PT [facility]

## 2023-03-31 ENCOUNTER — HOSPITAL ENCOUNTER (OUTPATIENT)
Facility: HOSPITAL | Age: 53
Setting detail: RECURRING SERIES
End: 2023-03-31
Payer: OTHER GOVERNMENT

## 2023-03-31 PROCEDURE — 97112 NEUROMUSCULAR REEDUCATION: CPT

## 2023-03-31 NOTE — PROGRESS NOTES
[address] [facility] PHYSICAL THERAPY  [address] [facility], [address], [place], [address] Ph: [phone] Fx: [phone]  PHYSICAL THERAPY PROGRESS NOTE            Patient Name: Gina Oneill : 1970   Treatment/Medical Diagnosis: Dizziness and giddiness [R42]   Referral Source: Tamiko Medrano MD       Date of Initial Visit: 2023 Attended Visits: 12 Missed Visits: 2      SUMMARY OF TREATMENT  Physical therapy has consisted of Neuromuscular re education for static and dynamic balance, VSE and VVI exercises, Smooth pursuit, Saccades, Convergence HEP, and pt education in activity modification, use of HEP for sx management. CURRENT STATUS  Ms Mari Smith has progressed well in physical therapy demonstrating and reporting improving static and dynamic balance and decreasing sx with exercises and ADLs. SLS: R: 30 seconds; L: 30 seconds  FGA:  improving from  @ initial evaluation. Patient will report at least 50% reduction in symptoms with ADLs. Status at last Eval: 70-80%  Current Status: 100%  Goal Met?  yes     2. Patient will be independent and compliant with HEP TID to reduce imbalance and dizziness with ADLs. Status at last Eval: N/A  Current Status: pt independent and compliant with current HEP. Goal Met?  yes     3. DHI will improve to less than or equal to 10 points to demonstrate reduction of dizziness and imbalance with ADLs. Status at last Eval: 16  Current Status: 4  Goal Met?  yes           RECOMMENDATIONS  Patient is meeting all LTGs. She will be placed on hold for 30 days. She is to continue with her HEP. She has been instructed to return to PT if symptoms worsen. If she does not return within 30 days, she will be discharged from PT    If you have any questions/comments please contact us directly at [phone]. Thank you for allowing us to assist in the care of your patient.      Kalpesh Bachelor PT     3/31/2023       1:30 PM

## 2023-10-11 NOTE — PROGRESS NOTES
PHYSICAL / OCCUPATIONAL THERAPY - DAILY TREATMENT NOTE (updated )    Patient Name: Severo Sieving    Date: 3/31/2023    : 1970  Insurance: Payor: [employer] / Plan: [employer] / Product Type: *No Product type* /      Patient  verified yes     Visit #   Current / Total 12 12   Time   In / Out 1235 105   Pain   In / Out 0 0   Subjective Functional Status/Changes: 95% improved. Working with busy background has been helpful   Changes to:  Meds, Allergies, Med Hx, Sx Hx? If yes, update Summary List no     TREATMENT AREA =  No admission diagnoses are documented for this encounter. OBJECTIVE      Therapeutic Procedures: Tx Min Billable or 1:1 Min (if diff from Tx Min) Procedure, Rationale, Specifics   30  [id number] Neuromuscular Re-Education (timed):  improve balance, coordination, kinesthetic sense, posture, core stability and proprioception to improve patient's ability to develop conscious control of individual muscles and awareness of position of extremities in order to progress to PLOF and address remaining functional goals. (see flow sheet as applicable)     Details if applicable:                             [facility] BC Totals Reminder: bill using total billable min of TIMED therapeutic procedures (example: do not include dry needle or estim unattended, both untimed codes, in totals to left)  8-22 min = 1 unit; 23-37 min = 2 units; 38-52 min = 3 units; 53-67 min = 4 units; 68-82 min = 5 units   Total Total     [x]  Patient Education billed concurrently with other procedures   [x] Review HEP    [] Progressed/Changed HEP, detail:    [] Other detail:       Objective Information/Functional Measures/Assessment    DHI= 4%    FGA=     I have reviewed discharge instructions with the patient. The patient verbalized understanding.           Patient will continue to benefit from skilled PT / OT services to modify and progress therapeutic interventions, analyze and address functional mobility deficits, analyze and address imbalance/dizziness, and instruct in home and community integration to address functional deficits and attain remaining goals.      Progress towards LTGs: SEE NOTE      PLAN   Continue plan of care  []  Upgrade activities as tolerated  [x]  Discharge due to :meeting goals  []  Other:    Batool Campa PT    3/31/2023    7:46 AM    Future Appointments   Date Time Provider Ken Flores   3/31/2023 12:30 PM Batool Campa PT [facility] negative...